# Patient Record
Sex: MALE | Race: WHITE | NOT HISPANIC OR LATINO | URBAN - METROPOLITAN AREA
[De-identification: names, ages, dates, MRNs, and addresses within clinical notes are randomized per-mention and may not be internally consistent; named-entity substitution may affect disease eponyms.]

---

## 2021-03-30 ENCOUNTER — AMBULATORY SURGICAL CENTER (OUTPATIENT)
Dept: URBAN - METROPOLITAN AREA SURGERY 2 | Facility: SURGERY | Age: 51
End: 2021-03-30
Payer: COMMERCIAL

## 2021-03-30 ENCOUNTER — OFFICE (OUTPATIENT)
Dept: URBAN - METROPOLITAN AREA PATHOLOGY 22 | Facility: PATHOLOGY | Age: 51
End: 2021-03-30
Payer: COMMERCIAL

## 2021-03-30 VITALS
SYSTOLIC BLOOD PRESSURE: 103 MMHG | RESPIRATION RATE: 20 BRPM | TEMPERATURE: 98.2 F | HEIGHT: 72 IN | SYSTOLIC BLOOD PRESSURE: 103 MMHG | HEART RATE: 102 BPM | OXYGEN SATURATION: 92 % | WEIGHT: 264 LBS | OXYGEN SATURATION: 94 % | DIASTOLIC BLOOD PRESSURE: 91 MMHG | RESPIRATION RATE: 20 BRPM | RESPIRATION RATE: 18 BRPM | SYSTOLIC BLOOD PRESSURE: 109 MMHG | HEART RATE: 94 BPM | DIASTOLIC BLOOD PRESSURE: 57 MMHG | OXYGEN SATURATION: 96 % | HEART RATE: 103 BPM | HEART RATE: 102 BPM | HEART RATE: 97 BPM | SYSTOLIC BLOOD PRESSURE: 83 MMHG | HEART RATE: 97 BPM | DIASTOLIC BLOOD PRESSURE: 58 MMHG | DIASTOLIC BLOOD PRESSURE: 58 MMHG | DIASTOLIC BLOOD PRESSURE: 91 MMHG | OXYGEN SATURATION: 95 % | WEIGHT: 264 LBS | SYSTOLIC BLOOD PRESSURE: 116 MMHG | HEART RATE: 103 BPM | OXYGEN SATURATION: 96 % | SYSTOLIC BLOOD PRESSURE: 116 MMHG | TEMPERATURE: 98.2 F | DIASTOLIC BLOOD PRESSURE: 71 MMHG | HEART RATE: 94 BPM | WEIGHT: 264 LBS | DIASTOLIC BLOOD PRESSURE: 71 MMHG | TEMPERATURE: 98.2 F | SYSTOLIC BLOOD PRESSURE: 103 MMHG | RESPIRATION RATE: 18 BRPM | SYSTOLIC BLOOD PRESSURE: 150 MMHG | SYSTOLIC BLOOD PRESSURE: 83 MMHG | TEMPERATURE: 98.7 F | OXYGEN SATURATION: 95 % | OXYGEN SATURATION: 92 % | TEMPERATURE: 98.7 F | DIASTOLIC BLOOD PRESSURE: 58 MMHG | DIASTOLIC BLOOD PRESSURE: 57 MMHG | SYSTOLIC BLOOD PRESSURE: 109 MMHG | OXYGEN SATURATION: 96 % | HEART RATE: 94 BPM | SYSTOLIC BLOOD PRESSURE: 150 MMHG | HEART RATE: 102 BPM | DIASTOLIC BLOOD PRESSURE: 91 MMHG | OXYGEN SATURATION: 92 % | OXYGEN SATURATION: 95 % | SYSTOLIC BLOOD PRESSURE: 150 MMHG | OXYGEN SATURATION: 94 % | SYSTOLIC BLOOD PRESSURE: 116 MMHG | HEART RATE: 103 BPM | RESPIRATION RATE: 20 BRPM | SYSTOLIC BLOOD PRESSURE: 109 MMHG | TEMPERATURE: 98.7 F | OXYGEN SATURATION: 94 % | HEIGHT: 72 IN | DIASTOLIC BLOOD PRESSURE: 71 MMHG | SYSTOLIC BLOOD PRESSURE: 83 MMHG | RESPIRATION RATE: 18 BRPM | HEART RATE: 97 BPM | HEIGHT: 72 IN | DIASTOLIC BLOOD PRESSURE: 57 MMHG

## 2021-03-30 DIAGNOSIS — D12.5 BENIGN NEOPLASM OF SIGMOID COLON: ICD-10-CM

## 2021-03-30 DIAGNOSIS — D12.2 BENIGN NEOPLASM OF ASCENDING COLON: ICD-10-CM

## 2021-03-30 DIAGNOSIS — D12.8 BENIGN NEOPLASM OF RECTUM: ICD-10-CM

## 2021-03-30 DIAGNOSIS — D12.4 BENIGN NEOPLASM OF DESCENDING COLON: ICD-10-CM

## 2021-03-30 DIAGNOSIS — Z12.11 ENCOUNTER FOR SCREENING FOR MALIGNANT NEOPLASM OF COLON: ICD-10-CM

## 2021-03-30 DIAGNOSIS — D12.3 BENIGN NEOPLASM OF TRANSVERSE COLON: ICD-10-CM

## 2021-03-30 DIAGNOSIS — D12.0 BENIGN NEOPLASM OF CECUM: ICD-10-CM

## 2021-03-30 PROBLEM — K63.5 POLYP OF COLON: Status: ACTIVE | Noted: 2021-03-30

## 2021-03-30 PROBLEM — K62.1 RECTAL POLYP: Status: ACTIVE | Noted: 2021-03-30

## 2021-03-30 PROCEDURE — 88305 TISSUE EXAM BY PATHOLOGIST: CPT | Performed by: INTERNAL MEDICINE

## 2021-03-30 NOTE — SERVICEHPINOTES
Patient referred for routine screening colonoscopy with no prior history of colonoscopy performed. Patient denies prior positive Stool DNA or Hemoccult tests. Patient is not currently taking Coumadin, Plavix or any other blood thinners. Patient denies history of heart or lung problems. Patient denies history of chest pain or shortness of breath. No history of kidney disease, renal dialysis, bleeding problems, or liver disease. Denies any hospitalizations or operations in the last six months. No history of rectal bleeding. Patient denies any other GI symptoms.